# Patient Record
Sex: FEMALE
[De-identification: names, ages, dates, MRNs, and addresses within clinical notes are randomized per-mention and may not be internally consistent; named-entity substitution may affect disease eponyms.]

---

## 2019-03-07 ENCOUNTER — HOSPITAL ENCOUNTER (OUTPATIENT)
Dept: HOSPITAL 31 - C.ENDO | Age: 64
Discharge: HOME | End: 2019-03-07
Attending: INTERNAL MEDICINE
Payer: COMMERCIAL

## 2019-03-07 VITALS — SYSTOLIC BLOOD PRESSURE: 95 MMHG | DIASTOLIC BLOOD PRESSURE: 43 MMHG | RESPIRATION RATE: 10 BRPM | HEART RATE: 60 BPM

## 2019-03-07 VITALS — OXYGEN SATURATION: 100 %

## 2019-03-07 VITALS — TEMPERATURE: 96.8 F

## 2019-03-07 VITALS — BODY MASS INDEX: 23.3 KG/M2

## 2019-03-07 DIAGNOSIS — K57.30: ICD-10-CM

## 2019-03-07 DIAGNOSIS — R63.4: Primary | ICD-10-CM

## 2019-03-07 DIAGNOSIS — K64.8: ICD-10-CM

## 2019-03-07 PROCEDURE — 45378 DIAGNOSTIC COLONOSCOPY: CPT

## 2019-03-07 NOTE — CP.SDSHP
[facility]





- History


Proposed Procedure: colonoscopy


Pre-Op Diagnosis: wt loss, polyps





- Previous Medical/Surgical History


Pulmonary: Asthma





- Allergies


Allergies: 


Allergies





No Known Allergies Allergy (Unverified 07/25/13 14:05)


   











- Physical Exam


Vital Signs: 


                                   Vital Signs











  03/07/19





  06:43


 


Pulse Rate 65


 


Respiratory 18





Rate 


 


Blood Pressure 118/67


 


O2 Sat by Pulse 98





Oximetry 











Mental Status: Alert & Oriented x3


Neuro: WNL


Heart: WNL


Lungs: WNL


GI: WNL





- {Optional Preform as Required}


Abdomen: WNL





- Impression


Impression: wt loss


Pt. Evaluated Today:Candidate for Anesthesia & Procedure: Yes





- Date & Time


Date: 03/07/19


Time: 08:15





Short Stay Discharge





- Short Stay Discharge


Admitting Diagnosis/Reason for Visit: ABNORMAL WEIGHT LOSS


Disposition: HOME/ ROUTINE

## 2019-04-12 ENCOUNTER — HOSPITAL ENCOUNTER (EMERGENCY)
Dept: HOSPITAL 31 - C.ER | Age: 64
Discharge: HOME | End: 2019-04-12
Payer: COMMERCIAL

## 2019-04-12 VITALS — HEART RATE: 69 BPM | DIASTOLIC BLOOD PRESSURE: 64 MMHG | SYSTOLIC BLOOD PRESSURE: 117 MMHG

## 2019-04-12 VITALS — BODY MASS INDEX: 23.6 KG/M2

## 2019-04-12 VITALS — RESPIRATION RATE: 16 BRPM | TEMPERATURE: 98.2 F

## 2019-04-12 VITALS — OXYGEN SATURATION: 98 %

## 2019-04-12 DIAGNOSIS — K52.9: Primary | ICD-10-CM

## 2019-04-12 LAB
ALBUMIN SERPL-MCNC: 4.6 G/DL (ref 3.5–5)
ALBUMIN/GLOB SERPL: 1.3 {RATIO} (ref 1–2.1)
ALT SERPL-CCNC: 22 U/L (ref 9–52)
AST SERPL-CCNC: 32 U/L (ref 14–36)
BASOPHILS # BLD AUTO: 0.1 K/UL (ref 0–0.2)
BASOPHILS NFR BLD: 0.7 % (ref 0–2)
BILIRUB UR-MCNC: NEGATIVE MG/DL
BUN SERPL-MCNC: 13 MG/DL (ref 7–17)
CALCIUM SERPL-MCNC: 9.5 MG/DL (ref 8.6–10.4)
EOSINOPHIL # BLD AUTO: 0 K/UL (ref 0–0.7)
EOSINOPHIL NFR BLD: 0.5 % (ref 0–4)
ERYTHROCYTE [DISTWIDTH] IN BLOOD BY AUTOMATED COUNT: 13.9 % (ref 11.5–14.5)
GFR NON-AFRICAN AMERICAN: > 60
GLUCOSE UR STRIP-MCNC: NORMAL MG/DL
HGB BLD-MCNC: 14.2 G/DL (ref 11–16)
LEUKOCYTE ESTERASE UR-ACNC: (no result) LEU/UL
LYMPHOCYTES # BLD AUTO: 2.1 K/UL (ref 1–4.3)
LYMPHOCYTES NFR BLD AUTO: 23.4 % (ref 20–40)
MCH RBC QN AUTO: 29.6 PG (ref 27–31)
MCHC RBC AUTO-ENTMCNC: 33.4 G/DL (ref 33–37)
MCV RBC AUTO: 88.7 FL (ref 81–99)
MONOCYTES # BLD: 0.5 K/UL (ref 0–0.8)
MONOCYTES NFR BLD: 5.3 % (ref 0–10)
NEUTROPHILS # BLD: 6.4 K/UL (ref 1.8–7)
NEUTROPHILS NFR BLD AUTO: 70.1 % (ref 50–75)
NRBC BLD AUTO-RTO: 0.1 % (ref 0–2)
PH UR STRIP: 6 [PH] (ref 5–8)
PLATELET # BLD: 368 K/UL (ref 130–400)
PMV BLD AUTO: 8 FL (ref 7.2–11.7)
PROT UR STRIP-MCNC: NEGATIVE MG/DL
RBC # BLD AUTO: 4.78 MIL/UL (ref 3.8–5.2)
RBC # UR STRIP: NEGATIVE /UL
SP GR UR STRIP: 1.01 (ref 1–1.03)
SQUAMOUS EPITHIAL: < 1 /HPF (ref 0–5)
UROBILINOGEN UR-MCNC: NORMAL MG/DL (ref 0.2–1)
WBC # BLD AUTO: 9.1 K/UL (ref 4.8–10.8)

## 2019-04-12 PROCEDURE — 87086 URINE CULTURE/COLONY COUNT: CPT

## 2019-04-12 PROCEDURE — 85025 COMPLETE CBC W/AUTO DIFF WBC: CPT

## 2019-04-12 PROCEDURE — 99285 EMERGENCY DEPT VISIT HI MDM: CPT

## 2019-04-12 PROCEDURE — 81001 URINALYSIS AUTO W/SCOPE: CPT

## 2019-04-12 PROCEDURE — 80053 COMPREHEN METABOLIC PANEL: CPT

## 2019-04-12 PROCEDURE — 74177 CT ABD & PELVIS W/CONTRAST: CPT

## 2019-04-12 NOTE — C.PDOC
History Of Present Illness


63 y/o F p/w lower abdominal pain x 9 days. Patient reports pain without 

dysuria, vomiting, back pain, dyspnea, bleeding. Patient has seen multiple 

outpatient providers for these complaints including at least twice in this past 

week and was given a prescription for CT abdomen/pelvis by her PMD to be taken 

to an ED for further evalaution but patient went to [facility] instead 

where she had more testing performed as well. Denies fever, chills, chest pain.


Time Seen by Provider: 04/12/19 11:58


Chief Complaint (Nursing): Abdominal Pain





Past Medical History


Vital Signs: 





                                Last Vital Signs











Temp  98.2 F   04/12/19 10:38


 


Pulse  82   04/12/19 10:38


 


Resp  16   04/12/19 10:38


 


BP  126/80   04/12/19 10:38


 


Pulse Ox  98   04/12/19 10:38














- Medical History


PMH: Asthma, Gall Bladder Disease


   Denies: Chronic Kidney Disease


Surgical History: Endoscopy


Family History: States: No Known Family Hx





- Social History


Hx Alcohol Use: No


Hx Substance Use: No





Review Of Systems


Except As Marked, All Systems Reviewed And Found Negative.


Constitutional: Negative for: Fever


Cardiovascular: Negative for: Chest Pain





Physical Exam





- Physical Exam


Additional Physical Exam Comments: 


Constitutional: No acute distress.


Head: Normocephalic. Atraumatic.


Eyes: PERRL.


ENT: Moist mucous membranes.


Neck: Supple.


Cardiovascular: Regular rate. Radial pulse 2+ bilaterally.


Chest: No tenderness.


Respiratory: Clear to auscultation bilaterally.


GI: Soft. Suprapubic tenderness without guarding or rebound. Nondistended.


Back: No CVA tenderness.


Musculoskeletal: No tenderness or swelling of extremities.


Skin: No rash.


Neurologic: Alert, no focal deficit.








ED Course And Treatment





- Laboratory Results


Result Diagrams: 


                                 04/12/19 12:23





                                 04/12/19 12:23


O2 Sat by Pulse Oximetry: 98





Medical Decision Making


Medical Decision Making: 














Date of service: 





04/12/2019





PROCEDURE:  CT Abdomen and Pelvis with contrast





HISTORY:


Lower abdominal pain 





COMPARISON:


None available.





TECHNIQUE:


CT scan of the abdomen and pelvis was performed after administration of 

intravenous contrast. Oral contrast was not administered.  Coronal and sagittal 

reformatted images were obtained.





Contrast dose: 100 mL Visipaque 320 





Radiation dose:





Total exam DLP = 471.14 mGy-cm.





This CT exam was performed using one or more of the following dose reduction 

techniques: Automated exposure control, adjustment of the mA and/or kV according

to patient size, and/or use of iterative reconstruction technique.





FINDINGS:





LOWER THORAX:


The visualized lungs are clear. 





LIVER:


Normal in size.  Diffuse fatty liver.  Normal homogeneous enhancement.  No gross

lesion or ductal dilatation. 





GALLBLADDER AND BILE DUCTS:


Surgically absent. 





PANCREAS:


Normal in size with homogeneous enhancement.  No gross lesion or ductal 

dilatation.





SPLEEN:


Normal in size and appearance. 





ADRENALS:


No discrete nodule. 





KIDNEYS AND URETERS:


Normal in size with homogeneous enhancement.  No hydronephrosis. No solid mass. 





VASCULATURE:


No aortic aneurysm.  There are no aortic atherosclerotic calcifications or mural

plaque present. 





BOWEL:


Evaluation of the bowel is limited in the absence of oral contrast.  The 

proximal small bowel loops are normal in caliber.  There are fluid filled normal

caliber mid and distal small bowel loops there is moderate amount of stool in 

the ascending colon.  There is scattered left colonic diverticulosis without CT 

evidence for acute diverticulitis 





APPENDIX:


Normal appendix. 





PERITONEUM:


No free fluid. No free air. 





LYMPH NODES:


No enlarged lymph nodes. 





BLADDER:


Partially decompressed. 





REPRODUCTIVE:


The uterus is normal in size. 





BONES:


No acute fracture.  





There is diffuse bone demineralization and multilevel degenerative changes in 

the spine.





OTHER FINDINGS:


None.





IMPRESSION:


1.  Fluid-filled normal caliber mid and distal small bowel loops could represent

diarrhea or nonspecific enteritis.





2.  Moderate amount of stool in the ascending colon.  No evidence for bowel 

obstruction. 





3.  Scattered left colonic diverticulosis without CT evidence for acute 

diverticulitis.





Discharged home, f/u primary care, given copy of CT report. 





Disposition





- Disposition


Disposition: HOME/ ROUTINE


Disposition Time: 14:41


Condition: GOOD


Additional Instructions: 


FINDINGS:





LOWER THORAX:


The visualized lungs are clear. 





LIVER:


Normal in size.  Diffuse fatty liver.  Normal homogeneous enhancement.  No gross

lesion or ductal dilatation. 





GALLBLADDER AND BILE DUCTS:


Surgically absent. 





PANCREAS:


Normal in size with homogeneous enhancement.  No gross lesion or ductal 

dilatation.





SPLEEN:


Normal in size and appearance. 





ADRENALS:


No discrete nodule. 





KIDNEYS AND URETERS:


Normal in size with homogeneous enhancement.  No hydronephrosis. No solid mass. 





VASCULATURE:


No aortic aneurysm.  There are no aortic atherosclerotic calcifications or mural

plaque present. 





BOWEL:


Evaluation of the bowel is limited in the absence of oral contrast.  The 

proximal small bowel loops are normal in caliber.  There are fluid filled normal

caliber mid and distal small bowel loops there is moderate amount of stool in 

the ascending colon.  There is scattered left colonic diverticulosis without CT 

evidence for acute diverticulitis 





APPENDIX:


Normal appendix. 





PERITONEUM:


No free fluid. No free air. 





LYMPH NODES:


No enlarged lymph nodes. 





BLADDER:


Partially decompressed. 





REPRODUCTIVE:


The uterus is normal in size. 





BONES:


No acute fracture.  





There is diffuse bone demineralization and multilevel degenerative changes in 

the spine.





OTHER FINDINGS:


None.





IMPRESSION:


1.  Fluid-filled normal caliber mid and distal small bowel loops could represent

diarrhea or nonspecific enteritis.





2.  Moderate amount of stool in the ascending colon.  No evidence for bowel 

obstruction. 





3.  Scattered left colonic diverticulosis without CT evidence for acute 

diverticulitis.


Prescriptions: 


levoFLOXacin [Levaquin] 1 tab PO DAILY #10 tab


Metronidazole [Flagyl] 500 mg PO Q8 #30 tab


Instructions:  Acute Abdomen (Belly Pain)


Forms:  [facility] (English)





- Clinical Impression


Clinical Impression: 


 Enteritis

## 2019-04-12 NOTE — CT
Date of service: 



04/12/2019



PROCEDURE:  CT Abdomen and Pelvis with contrast



HISTORY:

Lower abdominal pain 



COMPARISON:

None available.



TECHNIQUE:

CT scan of the abdomen and pelvis was performed after administration 

of intravenous contrast. Oral contrast was not administered.  Coronal 

and sagittal reformatted images were obtained.



Contrast dose: 100 mL Visipaque 320 



Radiation dose:



Total exam DLP = 471.14 mGy-cm.



This CT exam was performed using one or more of the following dose 

reduction techniques: Automated exposure control, adjustment of the 

mA and/or kV according to patient size, and/or use of iterative 

reconstruction technique.



FINDINGS:



LOWER THORAX:

The visualized lungs are clear. 



LIVER:

Normal in size.  Diffuse fatty liver.  Normal homogeneous 

enhancement.  No gross lesion or ductal dilatation. 



GALLBLADDER AND BILE DUCTS:

Surgically absent. 



PANCREAS:

Normal in size with homogeneous enhancement.  No gross lesion or 

ductal dilatation.



SPLEEN:

Normal in size and appearance. 



ADRENALS:

No discrete nodule. 



KIDNEYS AND URETERS:

Normal in size with homogeneous enhancement.  No hydronephrosis. No 

solid mass. 



VASCULATURE:

No aortic aneurysm.  There are no aortic atherosclerotic 

calcifications or mural plaque present. 



BOWEL:

Evaluation of the bowel is limited in the absence of oral contrast.  

The proximal small bowel loops are normal in caliber.  There are 

fluid filled normal caliber mid and distal small bowel loops there is 

moderate amount of stool in the ascending colon.  There is scattered 

left colonic diverticulosis without CT evidence for acute 

diverticulitis 



APPENDIX:

Normal appendix. 



PERITONEUM:

No free fluid. No free air. 



LYMPH NODES:

No enlarged lymph nodes. 



BLADDER:

Partially decompressed. 



REPRODUCTIVE:

The uterus is normal in size. 



BONES:

No acute fracture.  



There is diffuse bone demineralization and multilevel degenerative 

changes in the spine.



OTHER FINDINGS:

None.



IMPRESSION:

1.  Fluid-filled normal caliber mid and distal small bowel loops 

could represent diarrhea or nonspecific enteritis.



2.  Moderate amount of stool in the ascending colon.  No evidence for 

bowel obstruction. 



3.  Scattered left colonic diverticulosis without CT evidence for 

acute diverticulitis.